# Patient Record
Sex: FEMALE | Race: WHITE | NOT HISPANIC OR LATINO | URBAN - METROPOLITAN AREA
[De-identification: names, ages, dates, MRNs, and addresses within clinical notes are randomized per-mention and may not be internally consistent; named-entity substitution may affect disease eponyms.]

---

## 2017-12-13 ENCOUNTER — HOSPITAL ENCOUNTER (OUTPATIENT)
Dept: CARDIOLOGY | Facility: HOSPITAL | Age: 67
Discharge: HOME | End: 2017-12-13

## 2017-12-19 VITALS
OXYGEN SATURATION: 97 % | HEART RATE: 75 BPM | RESPIRATION RATE: 14 BRPM | SYSTOLIC BLOOD PRESSURE: 167 MMHG | OXYGEN SATURATION: 98 % | OXYGEN SATURATION: 100 % | RESPIRATION RATE: 8 BRPM | SYSTOLIC BLOOD PRESSURE: 137 MMHG | HEART RATE: 68 BPM | DIASTOLIC BLOOD PRESSURE: 71 MMHG | DIASTOLIC BLOOD PRESSURE: 94 MMHG | HEART RATE: 67 BPM | WEIGHT: 140 LBS | SYSTOLIC BLOOD PRESSURE: 135 MMHG | SYSTOLIC BLOOD PRESSURE: 155 MMHG | HEART RATE: 71 BPM | SYSTOLIC BLOOD PRESSURE: 139 MMHG | RESPIRATION RATE: 19 BRPM | HEART RATE: 70 BPM | DIASTOLIC BLOOD PRESSURE: 61 MMHG | RESPIRATION RATE: 15 BRPM | DIASTOLIC BLOOD PRESSURE: 78 MMHG | DIASTOLIC BLOOD PRESSURE: 75 MMHG | SYSTOLIC BLOOD PRESSURE: 141 MMHG | HEART RATE: 69 BPM | OXYGEN SATURATION: 99 % | RESPIRATION RATE: 18 BRPM | HEART RATE: 74 BPM | HEART RATE: 65 BPM | SYSTOLIC BLOOD PRESSURE: 152 MMHG | TEMPERATURE: 98.8 F | DIASTOLIC BLOOD PRESSURE: 91 MMHG | RESPIRATION RATE: 20 BRPM | SYSTOLIC BLOOD PRESSURE: 150 MMHG | HEART RATE: 80 BPM | SYSTOLIC BLOOD PRESSURE: 130 MMHG | DIASTOLIC BLOOD PRESSURE: 74 MMHG | OXYGEN SATURATION: 96 % | DIASTOLIC BLOOD PRESSURE: 90 MMHG | SYSTOLIC BLOOD PRESSURE: 133 MMHG | TEMPERATURE: 98.5 F | DIASTOLIC BLOOD PRESSURE: 88 MMHG

## 2017-12-20 ENCOUNTER — AMBULATORY SURGICAL CENTER (AMBULATORY)
Dept: URBAN - METROPOLITAN AREA SURGERY 17 | Facility: SURGERY | Age: 67
End: 2017-12-20
Payer: COMMERCIAL

## 2017-12-20 ENCOUNTER — OFFICE (AMBULATORY)
Dept: URBAN - METROPOLITAN AREA CLINIC 64 | Facility: CLINIC | Age: 67
End: 2017-12-20

## 2017-12-20 DIAGNOSIS — Z83.71 FAMILY HISTORY OF COLONIC POLYPS: ICD-10-CM

## 2017-12-20 DIAGNOSIS — Z12.11 ENCOUNTER FOR SCREENING FOR MALIGNANT NEOPLASM OF COLON: ICD-10-CM

## 2017-12-20 DIAGNOSIS — K57.30 DIVERTICULOSIS OF LARGE INTESTINE WITHOUT PERFORATION OR ABS: ICD-10-CM

## 2017-12-20 DIAGNOSIS — K62.1 RECTAL POLYP: ICD-10-CM

## 2017-12-20 DIAGNOSIS — D12.0 BENIGN NEOPLASM OF CECUM: ICD-10-CM

## 2017-12-20 DIAGNOSIS — K64.8 OTHER HEMORRHOIDS: ICD-10-CM

## 2017-12-20 DIAGNOSIS — D12.5 BENIGN NEOPLASM OF SIGMOID COLON: ICD-10-CM

## 2017-12-20 LAB
GI HISTOLOGY: A. UNSPECIFIED: (no result)
GI HISTOLOGY: B. UNSPECIFIED: (no result)
GI HISTOLOGY: PDF REPORT: (no result)

## 2017-12-20 PROCEDURE — 88305 TISSUE EXAM BY PATHOLOGIST: CPT

## 2017-12-20 PROCEDURE — 45380 COLONOSCOPY AND BIOPSY: CPT | Mod: 33

## 2017-12-20 RX ADMIN — PROPOFOL 100 MG: 10 INJECTION, EMULSION INTRAVENOUS at 14:06

## 2017-12-20 RX ADMIN — LIDOCAINE HYDROCHLORIDE 25 MG: 10 INJECTION, SOLUTION EPIDURAL; INFILTRATION; INTRACAUDAL; PERINEURAL at 14:06

## 2017-12-20 RX ADMIN — PROPOFOL 25 MG: 10 INJECTION, EMULSION INTRAVENOUS at 14:20

## 2017-12-20 RX ADMIN — PROPOFOL 25 MG: 10 INJECTION, EMULSION INTRAVENOUS at 14:15

## 2017-12-20 RX ADMIN — PROPOFOL 25 MG: 10 INJECTION, EMULSION INTRAVENOUS at 14:12

## 2017-12-20 RX ADMIN — PROPOFOL 50 MG: 10 INJECTION, EMULSION INTRAVENOUS at 14:09

## 2023-02-01 PROBLEM — Z83.71 FAMILY HISTORY OF COLON POLYPS: Status: ACTIVE | Noted: 2017-12-20

## 2023-03-23 ENCOUNTER — OFFICE (AMBULATORY)
Dept: URBAN - METROPOLITAN AREA PATHOLOGY 4 | Facility: PATHOLOGY | Age: 73
End: 2023-03-23
Payer: COMMERCIAL

## 2023-03-23 ENCOUNTER — ON CAMPUS - OUTPATIENT (AMBULATORY)
Dept: URBAN - METROPOLITAN AREA HOSPITAL 2 | Facility: HOSPITAL | Age: 73
End: 2023-03-23
Payer: COMMERCIAL

## 2023-03-23 VITALS
DIASTOLIC BLOOD PRESSURE: 100 MMHG | RESPIRATION RATE: 18 BRPM | RESPIRATION RATE: 14 BRPM | HEART RATE: 80 BPM | HEART RATE: 79 BPM | DIASTOLIC BLOOD PRESSURE: 91 MMHG | DIASTOLIC BLOOD PRESSURE: 97 MMHG | DIASTOLIC BLOOD PRESSURE: 78 MMHG | WEIGHT: 144 LBS | SYSTOLIC BLOOD PRESSURE: 176 MMHG | OXYGEN SATURATION: 97 % | SYSTOLIC BLOOD PRESSURE: 145 MMHG | HEART RATE: 73 BPM | OXYGEN SATURATION: 95 % | SYSTOLIC BLOOD PRESSURE: 177 MMHG | SYSTOLIC BLOOD PRESSURE: 144 MMHG | HEART RATE: 65 BPM | RESPIRATION RATE: 17 BRPM | OXYGEN SATURATION: 99 % | OXYGEN SATURATION: 94 % | HEART RATE: 71 BPM | SYSTOLIC BLOOD PRESSURE: 166 MMHG | DIASTOLIC BLOOD PRESSURE: 89 MMHG | DIASTOLIC BLOOD PRESSURE: 102 MMHG | SYSTOLIC BLOOD PRESSURE: 147 MMHG | DIASTOLIC BLOOD PRESSURE: 79 MMHG | TEMPERATURE: 97.8 F | SYSTOLIC BLOOD PRESSURE: 132 MMHG | RESPIRATION RATE: 16 BRPM | OXYGEN SATURATION: 98 %

## 2023-03-23 DIAGNOSIS — Z86.010 PERSONAL HISTORY OF COLONIC POLYPS: ICD-10-CM

## 2023-03-23 DIAGNOSIS — D12.5 BENIGN NEOPLASM OF SIGMOID COLON: ICD-10-CM

## 2023-03-23 DIAGNOSIS — K57.30 DIVERTICULOSIS OF LARGE INTESTINE WITHOUT PERFORATION OR ABS: ICD-10-CM

## 2023-03-23 DIAGNOSIS — K64.1 SECOND DEGREE HEMORRHOIDS: ICD-10-CM

## 2023-03-23 DIAGNOSIS — D12.4 BENIGN NEOPLASM OF DESCENDING COLON: ICD-10-CM

## 2023-03-23 PROBLEM — K63.5 POLYP OF COLON: Status: ACTIVE | Noted: 2023-03-23

## 2023-03-23 PROCEDURE — 88305 TISSUE EXAM BY PATHOLOGIST: CPT | Performed by: INTERNAL MEDICINE

## 2023-03-23 PROCEDURE — 45385 COLONOSCOPY W/LESION REMOVAL: CPT | Mod: 33 | Performed by: INTERNAL MEDICINE

## 2023-03-23 RX ADMIN — ONDANSETRON HYDROCHLORIDE 4 MG: 2 INJECTION, SOLUTION INTRAMUSCULAR; INTRAVENOUS at 07:39

## 2023-06-19 ENCOUNTER — TRANSCRIBE ORDERS (OUTPATIENT)
Dept: ADMINISTRATIVE | Facility: HOSPITAL | Age: 73
End: 2023-06-19

## 2023-06-19 DIAGNOSIS — Z13.820 SCREENING FOR OSTEOPOROSIS: Primary | ICD-10-CM

## 2024-04-10 ENCOUNTER — TRANSCRIBE ORDERS (OUTPATIENT)
Dept: ADMINISTRATIVE | Facility: HOSPITAL | Age: 74
End: 2024-04-10
Payer: MEDICARE

## 2024-04-10 DIAGNOSIS — E83.52 HYPERCALCEMIA: Primary | ICD-10-CM

## 2024-06-24 ENCOUNTER — HOSPITAL ENCOUNTER (OUTPATIENT)
Dept: BONE DENSITY | Facility: HOSPITAL | Age: 74
Discharge: HOME | End: 2024-06-24
Admitting: HOSPITALIST
Payer: MEDICARE

## 2024-06-24 DIAGNOSIS — E83.52 HYPERCALCEMIA: ICD-10-CM

## 2024-06-24 PROCEDURE — 77080 DXA BONE DENSITY AXIAL: CPT

## 2024-06-25 ENCOUNTER — OFFICE VISIT (OUTPATIENT)
Dept: ENDOCRINOLOGY | Facility: CLINIC | Age: 74
End: 2024-06-25
Payer: MEDICARE

## 2024-06-25 VITALS
HEIGHT: 58 IN | SYSTOLIC BLOOD PRESSURE: 144 MMHG | WEIGHT: 140 LBS | DIASTOLIC BLOOD PRESSURE: 82 MMHG | OXYGEN SATURATION: 95 % | BODY MASS INDEX: 29.39 KG/M2 | HEART RATE: 55 BPM

## 2024-06-25 DIAGNOSIS — E11.65 TYPE 2 DIABETES MELLITUS WITH HYPERGLYCEMIA, WITH LONG-TERM CURRENT USE OF INSULIN: ICD-10-CM

## 2024-06-25 DIAGNOSIS — N18.31 STAGE 3A CHRONIC KIDNEY DISEASE: ICD-10-CM

## 2024-06-25 DIAGNOSIS — Z79.4 TYPE 2 DIABETES MELLITUS WITH HYPERGLYCEMIA, WITH LONG-TERM CURRENT USE OF INSULIN: ICD-10-CM

## 2024-06-25 DIAGNOSIS — E21.0 PRIMARY HYPERPARATHYROIDISM: ICD-10-CM

## 2024-06-25 DIAGNOSIS — E83.52 HYPERCALCEMIA: Primary | ICD-10-CM

## 2024-06-25 PROCEDURE — G2211 COMPLEX E/M VISIT ADD ON: HCPCS | Performed by: INTERNAL MEDICINE

## 2024-06-25 PROCEDURE — 1159F MED LIST DOCD IN RCRD: CPT | Performed by: INTERNAL MEDICINE

## 2024-06-25 PROCEDURE — 99204 OFFICE O/P NEW MOD 45 MIN: CPT | Performed by: INTERNAL MEDICINE

## 2024-06-25 PROCEDURE — 1160F RVW MEDS BY RX/DR IN RCRD: CPT | Performed by: INTERNAL MEDICINE

## 2024-06-25 RX ORDER — ASPIRIN 81 MG/1
81 TABLET ORAL DAILY
COMMUNITY

## 2024-06-25 RX ORDER — METOPROLOL TARTRATE 50 MG/1
TABLET, FILM COATED ORAL
COMMUNITY

## 2024-06-25 RX ORDER — ROSUVASTATIN CALCIUM 10 MG/1
TABLET, COATED ORAL
COMMUNITY
Start: 2024-05-06

## 2024-06-25 RX ORDER — GLIPIZIDE 5 MG/1
TABLET ORAL
COMMUNITY
Start: 2024-06-14

## 2024-06-25 RX ORDER — ONDANSETRON 8 MG/1
1 TABLET, ORALLY DISINTEGRATING ORAL
COMMUNITY

## 2024-06-25 RX ORDER — MONTELUKAST SODIUM 10 MG/1
TABLET ORAL
COMMUNITY
Start: 2024-06-14

## 2024-06-25 RX ORDER — LOSARTAN POTASSIUM 100 MG/1
1 TABLET ORAL DAILY
COMMUNITY
Start: 2024-06-14

## 2024-06-25 RX ORDER — PROMETHAZINE HYDROCHLORIDE 25 MG/1
1 TABLET ORAL
COMMUNITY

## 2024-06-25 RX ORDER — CIDER VINEGAR 300 MG
1 TABLET ORAL 2 TIMES DAILY
COMMUNITY

## 2024-06-25 RX ORDER — FLUTICASONE PROPIONATE 50 MCG
SPRAY, SUSPENSION (ML) NASAL
COMMUNITY
Start: 2024-05-01

## 2024-06-25 RX ORDER — FEXOFENADINE HCL 180 MG/1
1 TABLET ORAL DAILY
COMMUNITY

## 2024-06-25 NOTE — PATIENT INSTRUCTIONS
Please,    - Start consuming 6 to 8 glasses of water every day.  - Please start taking vitamin D1 1000 units every day with meal.  - Maintain calcium intake around 1200 mg every day, please review the handout along with the visit summary.  - Plan for 24-hour urine evaluation along with spot blood work.  - Plan for SPECT-CT scan.  - Repeat blood work and clinical follow-up in 3 months time.    Thank you for your visit today.    If you have any questions or concerns please feel free to reach out of the office.

## 2024-06-25 NOTE — PROGRESS NOTES
-----------------------------------------------------------------  ENDOCRINE CLINIC NOTE  -----------------------------------------------------------------        PATIENT NAME: Arpit Henry  PATIENT : 1950 AGE: 73 y.o.  MRN NUMBER: [id number]  PRIMARY CARE: Provider, No Known    ==========================================================================    CHIEF COMPLAINT: Hypercalcemia  DATE OF SERVICE: 24    ==========================================================================    HPI / SUBJECTIVE    73 y.o. female is seen in the clinic today for hypercalcemia.    - Onset of hypercalcemia: 2-3 yrs time  - History of kidney stones: None  - History of fractures: None  - History of osteoporosis: None and last DEXA Scan in  and recently had repeat DXA Scan still pending results  - Family history of osteoporosis/fractures/parathyroid or calcium problems: None  - History of cancer or granulomatous disease: None  - Use of lithium: None  - Use of thiazides: Use to be on HCTZ stopped around 3-4 yrs back      - Bone pains: None  - Constipation: +ve on OTC Senna  - Fatigue: None  - Memory issues or confusion: Occasionally  - Loss of height: +ve height loss around 1/2 inch  - History of renal insufficiency: CKD IIIa      - Calcium intake: None   * Milk: Couple of times a week   * Yogurt: None   * Cheese: Couple of times a week   * Supplements: None  - Vitamin D supplementation or sun exposure: None    ==========================================================================                                                PAST MEDICAL HISTORY    Past Medical History:   Diagnosis Date    Diabetes insipidus     4 to 5 years or more    Hyperlipidemia     Years    Hypertension     Years    Hypoglycemia     I dont know       ==========================================================================    PAST SURGICAL HISTORY    Past Surgical History:   Procedure Laterality Date    CYST REMOVAL      NECK,  SHOULDER, BREAST, THUMB    HYSTERECTOMY      PARTIAL HYSTERECTOMY    TUBAL ABDOMINAL LIGATION         ==========================================================================    FAMILY HISTORY    Family History   Problem Relation Age of Onset    Hyperlipidemia Mother     Cancer Mother     Heart failure Mother     Hypertension Mother     Thyroid disease Mother     Stroke Mother     Diabetes Father     Hypertension Father     Heart failure Father     Hyperlipidemia Father     Heart failure Maternal Aunt     Cancer Paternal Aunt     Heart failure Paternal Aunt     Hyperlipidemia Maternal Grandmother     Hyperlipidemia Maternal Grandfather     Hyperlipidemia Paternal Grandmother     Heart failure Paternal Grandmother     Hyperlipidemia Paternal Grandfather     Stroke Daughter     Hyperlipidemia Son     Cancer Son     Hyperlipidemia Son        ==========================================================================    SOCIAL HISTORY    Social History     Socioeconomic History    Marital status:    Tobacco Use    Smoking status: Never    Smokeless tobacco: Never   Vaping Use    Vaping status: Never Used   Substance and Sexual Activity    Alcohol use: Yes     Comment: 1 X YEAR    Drug use: Never    Sexual activity: Not Currently     Partners: Male     Birth control/protection: None, Tubal ligation, Hysterectomy       ==========================================================================    MEDICATIONS      Current Outpatient Medications:     aspirin 81 MG EC tablet, Take 1 tablet by mouth Daily., Disp: , Rfl:     fexofenadine (ALLEGRA) 180 MG tablet, Take 1 tablet by mouth Daily., Disp: , Rfl:     fluticasone (FLONASE) 50 MCG/ACT nasal spray, Spray 2 sprays every day by intranasal route in the morning., Disp: , Rfl:     glipizide (GLUCOTROL) 5 MG tablet, Take 2 tablets every day by oral route for 90 days., Disp: , Rfl:     losartan (COZAAR) 100 MG tablet, Take 1 tablet by mouth Daily., Disp: , Rfl:      Magnesium 500 MG tablet, Take 1 tablet by mouth Daily., Disp: , Rfl:     metoprolol tartrate (LOPRESSOR) 50 MG tablet, metoprolol tartrate 50 mg tablet  1 tablet twice a day by mouth, Disp: , Rfl:     montelukast (SINGULAIR) 10 MG tablet, Take 1 tablet every day by oral route in the morning for 90 days., Disp: , Rfl:     Omega-3 Fatty Acids (Fish Oil) 1000 MG capsule delayed-release, Take 1 capsule by mouth 2 (Two) Times a Day., Disp: , Rfl:     ondansetron ODT (ZOFRAN-ODT) 8 MG disintegrating tablet, Take 1 tablet by mouth., Disp: , Rfl:     promethazine (PHENERGAN) 25 MG tablet, Take 1 tablet by mouth., Disp: , Rfl:     rosuvastatin (CRESTOR) 10 MG tablet, Take 2 tablets every day by oral route., Disp: , Rfl:     ==========================================================================    ALLERGIES    Allergies   Allergen Reactions    Codeine Hallucinations and Other (See Comments)    Penicillins Rash       ==========================================================================    OBJECTIVE    Vitals:    06/25/24 1127   BP: 144/82   Pulse: 55   SpO2: 95%     Body mass index is 29.26 kg/m².     General: Alert, cooperative, no acute distress  Thyroid:  no enlargement/tenderness/palpable nodules  Lungs: Clear to auscultation bilaterally, respirations unlabored  Heart: Regular rate and rhythm, S1 and S2 normal, no murmur, rub or gallop  Abdomen: Soft, NT, ND and Bowel sounds Positive  Extremities:  Extremities normal, atraumatic, no cyanosis or edema    ==========================================================================    LAB EVALUATION    RENAL FUNCTION PANEL [LAB19] (Order [id number])  Order  Date: 6/3/2024 Department: Generic External Data Department Documenting/Authorizing: Provider, Generic External Data     Reprint Order Requisition    RENAL FUNCTION PANEL (Order #[id number]) on 6/3/24            suggestion  Result Information displayed in this report will not trend and may not trigger automated  decision support.      Contains abnormal data RENAL FUNCTION PANEL  Order: [id number]  Component  Ref Range & Units 3 wk ago   Glucose  70 - 99 mg/dL 219 High    BUN  8 - 27 mg/dL 17   Creatinine  0.57 - 1.00 mg/dL 1.11 High    eGFR CKD-EPI CR 2021  >59 mL/min/1.73 53 Low    BUN/Creatinine Ratio  12 - 28 15   Sodium  134 - 144 mmol/L 137   Potassium  3.5 - 5.2 mmol/L 4.3   Chloride  96 - 106 mmol/L 98   Bicarbonate (CO2)  20 - 29 mmol/L 26   Calcium  8.7 - 10.3 mg/dL 11.0 High    Phosphorus  3.0 - 4.3 mg/dL 3.6   Albumin  3.8 - 4.8 g/dL 4.7   Resulting Agency See order comments   Narrative  Performed by [facility]  Performed at:  [address]  [facility]  [id number]  : Enrique Myers PhD, Phone:  [phone]    Specimen Collected: 06/03/24 11:24    Performed by: MARCEL Last Resulted: 06/04/24 14:10   Received From: [facility]  Result Received: 06/24/24 12:46       PTH, INTACT [CYS235] (Order [id number])  Order  Date: 6/3/2024 Department: Generic External Data Department Documenting/Authorizing: Provider, Generic External Data     Reprint Order Requisition    PTH, INTACT (Order #[id number]) on 6/3/24           PTH, INTACT  Order: [id number]  Status: Final result       Next appt: None    Specimen Information: Blood, Venous       Component  Ref Range & Units 3 wk ago   PTH, Intact  15 - 65 pg/mL 58   Resulting Agency See order comments              Narrative  Performed by: [facility]  Performed at:  [address] - [facility]  [id number]  : Enrique Myers PhD, Phone:  [phone]      Specimen Collected: 06/03/24 11:24 EDT    Performed By: MARCEL Last Resulted: 06/04/24 14:10 EDT   Received From: [facility]  Result Received: 06/24/24 12:46           VITAMIN D,25-HYDROXY [KRL655] (Order [id number])  Order  Date: 6/3/2024 Department: Generic External Data Department Documenting/Authorizing: Provider, Generic External Data     Reprint Order  Requisition    VITAMIN D,25-HYDROXY (Order #[id number]) on 6/3/24           VITAMIN D,25-HYDROXY  Order: [id number]  Status: Final result       Next appt: None    Specimen Information: Blood, Venous       Component  Ref Range & Units 3 wk ago   25 Hydroxy, Vitamin D  30.0 - 100.0 ng/mL 40.5   Comment: Vitamin D deficiency has been defined by the [place] of  Medicine and an Endocrine Society practice guideline as a  level of serum 25-OH vitamin D less than 20 ng/mL (1,2).  The Endocrine Society went on to further define vitamin D  insufficiency as a level between 21 and 29 ng/mL (2).  1. IOM ([place] of [employer]). 2010. Dietary reference     intakes for calcium and D. [place]: The     National Academies Press.  2. Satish MF, Catracho MONGE, Kendra LÓPEZ, et al.     Evaluation, treatment, and prevention of vitamin D     deficiency: an Endocrine Society clinical practice     guideline. JCEM. 2011 Jul; 96(7):1911-30.   Resulting Agency See order comments              Narrative  Performed by: [facility]  Performed at:  [address] - [facility] [address]  [id number]  : Enrique Myers PhD, Phone:  [phone]      Specimen Collected: 06/03/24 11:24 EDT    Performed By: [facility] Last Resulted: 06/04/24 14:10 EDT   Received From: [facility]  Result Received: 06/24/24 12:46             3/15/2024  A1c 7.9%  Vitamin B12 361  Total cholesterol 118, triglyceride 205, HDL 39, LDL 46  Glucose 144, BUN 16, creatinine 1.17, GFR 50, sodium 136, potassium 4.8, calcium 10.7, albumin 4.3, total protein 6.5, ALP 58, AST 17, ALT 12  WBC 5.7, hemoglobin 12.8, platelet 182  TSH 1.580  Free T41.07    ==========================================================================    ASSESSMENT AND PLAN    # Hypercalcemia with primary hyperparathyroidism  - Patient evidence of true hypercalcemia with abnormally normal PTH level suggesting primary hyperparathyroidism  - Patient had vitamin D levels done in  June 2024 which were normal  - Will further evaluate with SPEP and 24-hour urine workup as well  - Pathophysiology of parathyroid gland and hypercalcemia was discussed with patient in detail to which she verbalized understanding  - Patient to maintain calcium intake around 1200 mg every day, ideally through nutritional sources, handout provided  - Patient also start taking vitamin D 1000 units every day which was counseled to the patient as well in detail to which verbalized understanding  - Patient to maintain hydration around 6 to 8 glasses of water every day to which she verbalized understanding  - Patient underlying history of CKD stage IIIa and therefore patient is a surgical candidate  - Will order SPECT-CT scan for evaluation  - Patient already had a DEXA scan performed on 6/24/2024, pending results prior to that patient have a history of normal DEXA scan  - After repeat blood work in next follow-up visit possibly will plan for surgical evaluation    # CKD stage IIIa  # Type 2 diabetes, being managed by primary care    Thank you for courtesy of consultation.    Return to clinic: 3 months    Entire assessment and plan was discussed and counseled the patient in detail to which patient verbalized understanding and agreed with care.  Answered all queries and concerns.    Part of this note may be an electronic transcription/translation of spoken language to printed text using the Dragon Dictation System.     Note: Portions of this note may have been copied from previous notes but documentation have been reviewed and edited as necessary to support clinical decision making for today's visit.    ==========================================================================    INFORMATION PROVIDED TO PATIENT    Patient Instructions   Please,    - Start consuming 6 to 8 glasses of water every day.  - Please start taking vitamin D1 1000 units every day with meal.  - Maintain calcium intake around 1200 mg every day, please  review the handout along with the visit summary.  - Plan for 24-hour urine evaluation along with spot blood work.  - Plan for SPECT-CT scan.  - Repeat blood work and clinical follow-up in 3 months time.    Thank you for your visit today.    If you have any questions or concerns please feel free to reach out of the office.       ==========================================================================  William Chavarria MD  Department of Endocrine, Diabetes and Metabolism  [place], [place]  ==========================================================================

## 2024-07-02 LAB
ALBUMIN SERPL ELPH-MCNC: 3.8 G/DL (ref 2.9–4.4)
ALBUMIN/GLOB SERPL: 1.3 {RATIO} (ref 0.7–1.7)
ALPHA1 GLOB SERPL ELPH-MCNC: 0.2 G/DL (ref 0–0.4)
ALPHA2 GLOB SERPL ELPH-MCNC: 0.8 G/DL (ref 0.4–1)
B-GLOBULIN SERPL ELPH-MCNC: 1.1 G/DL (ref 0.7–1.3)
GAMMA GLOB SERPL ELPH-MCNC: 0.8 G/DL (ref 0.4–1.8)
GLOBULIN SER CALC-MCNC: 2.9 G/DL (ref 2.2–3.9)
LABORATORY COMMENT REPORT: NORMAL
M PROTEIN SERPL ELPH-MCNC: NORMAL G/DL
PROT SERPL-MCNC: 6.7 G/DL (ref 6–8.5)

## 2024-07-04 LAB
CALCIUM 24H UR-MCNC: 6.8 MG/DL
CALCIUM 24H UR-MRATE: 78 MG/24 HR (ref 0–320)

## 2024-07-24 ENCOUNTER — HOSPITAL ENCOUNTER (OUTPATIENT)
Dept: NUCLEAR MEDICINE | Facility: HOSPITAL | Age: 74
Discharge: HOME | End: 2024-07-24
Payer: MEDICARE

## 2024-07-24 DIAGNOSIS — E21.0 PRIMARY HYPERPARATHYROIDISM: ICD-10-CM

## 2024-07-24 DIAGNOSIS — E83.52 HYPERCALCEMIA: ICD-10-CM

## 2024-07-24 PROCEDURE — 0 TECHNETIUM SESTAMIBI: Performed by: INTERNAL MEDICINE

## 2024-07-24 PROCEDURE — 78072 PARATHYRD PLANAR W/SPECT&CT: CPT

## 2024-07-24 PROCEDURE — A9500 TC99M SESTAMIBI: HCPCS | Performed by: INTERNAL MEDICINE

## 2024-07-24 RX ADMIN — TECHNETIUM TC 99M SESTAMIBI 1 DOSE: 1 INJECTION INTRAVENOUS at 11:43

## 2024-07-29 ENCOUNTER — TELEPHONE (OUTPATIENT)
Dept: ENDOCRINOLOGY | Facility: CLINIC | Age: 74
End: 2024-07-29

## 2024-07-31 ENCOUNTER — TELEPHONE (OUTPATIENT)
Dept: ENDOCRINOLOGY | Facility: CLINIC | Age: 74
End: 2024-07-31
Payer: MEDICARE

## 2024-07-31 DIAGNOSIS — E04.1 THYROID NODULE: Primary | ICD-10-CM

## 2024-07-31 NOTE — TELEPHONE ENCOUNTER
Pt is calling about her NM Parathyroid Scan results.     Asked it to be faxed to [phone].     Please advise.

## 2024-08-02 NOTE — TELEPHONE ENCOUNTER
Called patient and care discussed with patient over the phone.  NM parathyroid scan reviewed.  There is possible parathyroid adenoma vs thyroid nodule.  Ordered US thyroid for evaluation.    William Chavarria MD  15:52 EDT  08/02/24

## 2024-08-02 NOTE — TELEPHONE ENCOUNTER
Spoke with patient and let her know previous message have been sent to provider. Put on providers desk.

## 2024-08-02 NOTE — TELEPHONE ENCOUNTER
Hub staff attempted to follow warm transfer process and was unsuccessful     Caller: Arpit Henry    Relationship to patient: Self    Best call back number: [phone]    Patient is needing: PT IS CALLING TO SPEAK WITH A NURSE BECAUSE SHE HAS NOT HEARD FROM OUR OFFICE ON THE TEST RESULTS. PLEASE CALL AND ADVISE. IT'S OK TO [facility].

## 2024-08-23 ENCOUNTER — HOSPITAL ENCOUNTER (OUTPATIENT)
Dept: ULTRASOUND IMAGING | Facility: HOSPITAL | Age: 74
Discharge: HOME | End: 2024-08-23
Payer: MEDICARE

## 2024-08-23 DIAGNOSIS — E04.1 THYROID NODULE: ICD-10-CM

## 2024-08-23 PROCEDURE — 76536 US EXAM OF HEAD AND NECK: CPT

## 2024-08-30 ENCOUNTER — TELEPHONE (OUTPATIENT)
Dept: ENDOCRINOLOGY | Facility: CLINIC | Age: 74
End: 2024-08-30
Payer: MEDICARE

## 2024-08-30 DIAGNOSIS — E21.0 PRIMARY HYPERPARATHYROIDISM: Primary | ICD-10-CM

## 2024-09-06 ENCOUNTER — TELEPHONE (OUTPATIENT)
Dept: ENDOCRINOLOGY | Facility: CLINIC | Age: 74
End: 2024-09-06
Payer: MEDICARE

## 2024-09-06 NOTE — TELEPHONE ENCOUNTER
Note:    - Care discussed with patient over the phone in detail.  - Patient had ultrasound thyroid which does not require any FNA but will plan for yearly ultrasounds for now.  - Regarding hypercalcemia patient recently had blood work done on 8/23/2024 which showed persistent high calcium levels with inappropriately normal PTH.  - Patient does not have any indication for surgery but provided patient with the option that if she wants to get a surgical evaluation we will plan for referral but patient deferred at this time.  - Patient to maintain good calcium and vitamin D intake to prevent further triggering of PTH and to prevent bone from osteoporosis.  - Patient to maintain good hydration around 6 to 8 glasses of water every day, this was counseled to the patient in detail to which she verbalized understanding.  - Repeat lab work and clinical follow-up in December 2024.    William Chavarria MD  13:20 EDT  09/06/24

## 2024-09-08 NOTE — PROGRESS NOTES
Encounter Date:09/10/2024      Patient ID: Arpit Henry is a 73 y.o. female.    Chief Complaint:  Hypertension  Dyslipidemia  Diabetes  Mammogram showing arterial calcification    History of Present Illness    The patient is a pleasant 73-year-old white female is here for evaluation of cardiovascular status.  Patient had routine mammogram and showed arterial calcification and patient was referred for possible correlation between breast arterial calcification and coronary arteries.  Patient is absolutely asymptomatic.  Patient plays pickle ball etc. without any problems.  She plays golf without any symptoms.    The patient has been without any chest discomfort ,shortness of breath, palpitations, dizziness or syncope.  Denies having any headache ,abdominal pain ,nausea, vomiting , diarrhea constipation, loss of weight or loss of appetite.  Denies having any excessive bruising ,hematuria or blood in the stool.    Review of all systems negative except as indicated.    Reviewed ROS.  Assessment and Plan     ]]]]]]]]]]]]]]]]]]]]]]]]  Impression  ===========  - Mammogram showing arterial calcification  Mammogram August 2024      Patient is absolutely asymptomatic.    - Diabetes hypertension dyslipidemia CKD 3    - Status post hysterectomy abdominal tubal ligation    - Family history of dyslipidemia    - Non-smoker    - Allergic to penicillin and codeine  ============  Plan  ===============     Mammogram showing arterial calcification  Mammogram August 2024      Patient is absolutely asymptomatic.    Patient does have multiple coronary risk factors.  EKG showed sinus rhythm without ST ischemic changes.    Consider ischemic cardiac workup if patient has any symptoms.  Patient was educated regarding symptoms to watch for.    Follow-up in the office in 6 months.    Further plan will depend on patient's progress.  ]]]]]]]]]]]]]]]]]]]]]]]]       Diagnosis Plan   1. Dyslipidemia        2. Essential hypertension        3.  Type 2 diabetes mellitus without complication, without long-term current use of insulin        4. Stage 3 chronic kidney disease, unspecified whether stage 3a or 3b CKD        LAB RESULTS (LAST 7 DAYS)    CBC        BMP        CMP         BNP        TROPONIN        CoAg        Creatinine Clearance  CrCl cannot be calculated (No successful lab value found.).    ABG        Radiology  No radiology results for the last day                The following portions of the patient's history were reviewed and updated as appropriate: allergies, current medications, past family history, past medical history, past social history, past surgical history, and problem list.    Review of Systems   Constitutional: Negative for malaise/fatigue.   Cardiovascular:  Negative for chest pain, dyspnea on exertion, leg swelling and palpitations.   Respiratory:  Negative for cough and shortness of breath.    Gastrointestinal:  Negative for abdominal pain, nausea and vomiting.   Neurological:  Negative for dizziness, focal weakness, headaches, light-headedness and numbness.   All other systems reviewed and are negative.        Current Outpatient Medications:     aspirin 81 MG EC tablet, Take 1 tablet by mouth Daily., Disp: , Rfl:     fexofenadine (ALLEGRA) 180 MG tablet, Take 1 tablet by mouth Daily., Disp: , Rfl:     fluticasone (FLONASE) 50 MCG/ACT nasal spray, Spray 2 sprays every day by intranasal route in the morning., Disp: , Rfl:     glipizide (GLUCOTROL) 5 MG tablet, Take 2 tablets every day by oral route for 90 days., Disp: , Rfl:     losartan (COZAAR) 100 MG tablet, Take 1 tablet by mouth Daily., Disp: , Rfl:     Magnesium 500 MG tablet, Take 1 tablet by mouth Daily., Disp: , Rfl:     metoprolol succinate XL (TOPROL-XL) 100 MG 24 hr tablet, Take 1 tablet by mouth Daily., Disp: , Rfl:     montelukast (SINGULAIR) 10 MG tablet, Take 1 tablet every day by oral route in the morning for 90 days., Disp: , Rfl:     Omega-3 Fatty Acids (Fish  Oil) 1000 MG capsule delayed-release, Take 1 capsule by mouth 2 (Two) Times a Day., Disp: , Rfl:     ondansetron ODT (ZOFRAN-ODT) 8 MG disintegrating tablet, Take 1 tablet by mouth., Disp: , Rfl:     promethazine (PHENERGAN) 25 MG tablet, Take 1 tablet by mouth., Disp: , Rfl:     rosuvastatin (CRESTOR) 10 MG tablet, Take 2 tablets every day by oral route., Disp: , Rfl:     Trulicity 0.75 MG/0.5ML solution pen-injector, INJECT 0.75 MG EVERY WEEK BY SUBCUTANEOUS ROUTE AS DIRECTED., Disp: , Rfl:     Allergies   Allergen Reactions    Codeine Hallucinations and Other (See Comments)    Penicillins Rash       Family History   Problem Relation Age of Onset    Hyperlipidemia Mother     Cancer Mother     Heart failure Mother     Hypertension Mother     Thyroid disease Mother     Stroke Mother     Diabetes Father     Hypertension Father     Heart failure Father     Hyperlipidemia Father     Heart failure Maternal Aunt     Cancer Paternal Aunt     Heart failure Paternal Aunt     Hyperlipidemia Maternal Grandmother     Hyperlipidemia Maternal Grandfather     Hyperlipidemia Paternal Grandmother     Heart failure Paternal Grandmother     Hyperlipidemia Paternal Grandfather     Stroke Daughter     Hyperlipidemia Son     Cancer Son     Hyperlipidemia Son        Past Surgical History:   Procedure Laterality Date    CYST REMOVAL      NECK, SHOULDER, BREAST, THUMB    HYSTERECTOMY      PARTIAL HYSTERECTOMY    TUBAL ABDOMINAL LIGATION         Past Medical History:   Diagnosis Date    Diabetes insipidus     4 to 5 years or more    Hyperlipidemia     Years    Hypertension     Years    Hypoglycemia     I dont know       Family History   Problem Relation Age of Onset    Hyperlipidemia Mother     Cancer Mother     Heart failure Mother     Hypertension Mother     Thyroid disease Mother     Stroke Mother     Diabetes Father     Hypertension Father     Heart failure Father     Hyperlipidemia Father     Heart failure Maternal Aunt     Cancer  "Paternal Aunt     Heart failure Paternal Aunt     Hyperlipidemia Maternal Grandmother     Hyperlipidemia Maternal Grandfather     Hyperlipidemia Paternal Grandmother     Heart failure Paternal Grandmother     Hyperlipidemia Paternal Grandfather     Stroke Daughter     Hyperlipidemia Son     Cancer Son     Hyperlipidemia Son        Social History     Socioeconomic History    Marital status:    Tobacco Use    Smoking status: Never     Passive exposure: Never    Smokeless tobacco: Never   Vaping Use    Vaping status: Never Used   Substance and Sexual Activity    Alcohol use: Yes     Comment: 1 X YEAR    Drug use: Never    Sexual activity: Not Currently     Partners: Male     Birth control/protection: None, Tubal ligation, Hysterectomy           ECG 12 Lead    Date/Time: 9/10/2024 2:25 PM  Performed by: Cristobal Dias MD    Authorized by: Cristobal Dias MD  Comparison: compared with previous ECG   Similar to previous ECG  Comparison to previous ECG: Normal sinus rhythm nonspecific ST-T wave changes 73/min normal axis normal intervals no ectopy no significant change from previous EKG.        Objective:       Physical Exam    /78 (BP Location: Right arm, Patient Position: Sitting, Cuff Size: Adult)   Pulse 67   Ht 147.3 cm (58\")   Wt 63.5 kg (140 lb)   SpO2 94%   BMI 29.26 kg/m²   The patient is alert, oriented and in no distress.    Vital signs as noted above.    Head and neck revealed no carotid bruits or jugular venous distension.  No thyromegaly or lymphadenopathy is present.    Lungs clear.  No wheezing.  Breath sounds are normal bilaterally.    Heart normal first and second heart sounds.  No murmur..  No pericardial rub is present.  No gallop is present.    Abdomen soft and nontender.  No organomegaly is present.    Extremities revealed good peripheral pulses without any pedal edema.    Skin warm and dry.    Musculoskeletal system is grossly normal.    CNS grossly normal.    Reviewed and " updated.

## 2024-09-10 ENCOUNTER — PATIENT ROUNDING (BHMG ONLY) (OUTPATIENT)
Dept: CARDIOLOGY | Facility: CLINIC | Age: 74
End: 2024-09-10

## 2024-09-10 ENCOUNTER — OFFICE VISIT (OUTPATIENT)
Dept: CARDIOLOGY | Facility: CLINIC | Age: 74
End: 2024-09-10
Payer: MEDICARE

## 2024-09-10 VITALS
HEIGHT: 58 IN | SYSTOLIC BLOOD PRESSURE: 144 MMHG | BODY MASS INDEX: 29.39 KG/M2 | DIASTOLIC BLOOD PRESSURE: 78 MMHG | WEIGHT: 140 LBS | HEART RATE: 67 BPM | OXYGEN SATURATION: 94 %

## 2024-09-10 DIAGNOSIS — I10 ESSENTIAL HYPERTENSION: ICD-10-CM

## 2024-09-10 DIAGNOSIS — N18.30 STAGE 3 CHRONIC KIDNEY DISEASE, UNSPECIFIED WHETHER STAGE 3A OR 3B CKD: ICD-10-CM

## 2024-09-10 DIAGNOSIS — E11.9 TYPE 2 DIABETES MELLITUS WITHOUT COMPLICATION, WITHOUT LONG-TERM CURRENT USE OF INSULIN: ICD-10-CM

## 2024-09-10 DIAGNOSIS — E78.5 DYSLIPIDEMIA: Primary | ICD-10-CM

## 2024-09-10 RX ORDER — DULAGLUTIDE 0.75 MG/.5ML
INJECTION, SOLUTION SUBCUTANEOUS
COMMUNITY
Start: 2024-09-03

## 2024-09-10 RX ORDER — METOPROLOL SUCCINATE 100 MG/1
1 TABLET, EXTENDED RELEASE ORAL DAILY
COMMUNITY

## 2024-09-10 NOTE — PROGRESS NOTES
A My-Chart message has been sent to the patient for PATIENT ROUNDING with [facility]

## 2024-10-14 ENCOUNTER — OFFICE VISIT (OUTPATIENT)
Dept: ENDOCRINOLOGY | Facility: CLINIC | Age: 74
End: 2024-10-14
Payer: MEDICARE

## 2024-10-14 VITALS
SYSTOLIC BLOOD PRESSURE: 146 MMHG | WEIGHT: 137 LBS | OXYGEN SATURATION: 94 % | DIASTOLIC BLOOD PRESSURE: 72 MMHG | HEIGHT: 58 IN | BODY MASS INDEX: 28.76 KG/M2 | HEART RATE: 69 BPM

## 2024-10-14 DIAGNOSIS — E83.52 HYPERCALCEMIA: ICD-10-CM

## 2024-10-14 DIAGNOSIS — E21.0 PRIMARY HYPERPARATHYROIDISM: Primary | ICD-10-CM

## 2024-10-14 DIAGNOSIS — E04.1 THYROID NODULE: ICD-10-CM

## 2024-10-14 DIAGNOSIS — E11.65 TYPE 2 DIABETES MELLITUS WITH HYPERGLYCEMIA, WITH LONG-TERM CURRENT USE OF INSULIN: ICD-10-CM

## 2024-10-14 DIAGNOSIS — Z79.4 TYPE 2 DIABETES MELLITUS WITH HYPERGLYCEMIA, WITH LONG-TERM CURRENT USE OF INSULIN: ICD-10-CM

## 2024-10-14 DIAGNOSIS — E55.9 VITAMIN D DEFICIENCY: ICD-10-CM

## 2024-10-14 DIAGNOSIS — N18.31 STAGE 3A CHRONIC KIDNEY DISEASE: ICD-10-CM

## 2024-10-14 PROCEDURE — 99214 OFFICE O/P EST MOD 30 MIN: CPT | Performed by: INTERNAL MEDICINE

## 2024-10-14 PROCEDURE — G2211 COMPLEX E/M VISIT ADD ON: HCPCS | Performed by: INTERNAL MEDICINE

## 2024-10-14 PROCEDURE — 1159F MED LIST DOCD IN RCRD: CPT | Performed by: INTERNAL MEDICINE

## 2024-10-14 PROCEDURE — 1160F RVW MEDS BY RX/DR IN RCRD: CPT | Performed by: INTERNAL MEDICINE

## 2024-10-14 RX ORDER — DAPAGLIFLOZIN 5 MG/1
5 TABLET, FILM COATED ORAL
COMMUNITY
Start: 2024-09-16

## 2024-10-14 NOTE — PROGRESS NOTES
-----------------------------------------------------------------  ENDOCRINE CLINIC NOTE  -----------------------------------------------------------------        PATIENT NAME: Arpit Hnery  PATIENT : 1950 AGE: 74 y.o.  MRN NUMBER: [id number]  PRIMARY CARE: Provider, No Known    ==========================================================================    CHIEF COMPLAINT: Hypercalcemia  DATE OF SERVICE: 10/14/24    ==========================================================================    HPI / SUBJECTIVE    74 y.o. female is seen in the clinic today for hypercalcemia.  Patient is known to have hypercalcemia for last 2 to 3 years time.  No history of kidney stones.  No fracture history.  Last DEXA scan was in 2024 which showed no evidence of osteoporosis.  No previous history of cancer.  Patient to have a history of constipation currently on conservative management.  History significant for CKD stage IIIa as well  Also underlying history of type 2 diabetes which is managed by primary care  Patient is currently maintaining calcium intake through dietary dairy sources where as she is taking vitamin D 1000 units every day.  Last blood work was in 2024 which showed abnormally normal PTH level with mild hypercalcemia.  Patient had 24-hour urine calcium evaluation along with SPEP evaluation.  Patient completed nuclear medicine parathyroid scan which showed/identified a thyroid nodule therefore patient underwent ultrasound thyroid as well which showed multiple thyroid nodules but does not meet criteria for FNA but require yearly monitoring.    ==========================================================================                                                PAST MEDICAL HISTORY    Past Medical History:   Diagnosis Date    Diabetes insipidus     4 to 5 years or more    Hyperlipidemia     Years    Hypertension     Years    Hypoglycemia     I dont know    Primary hyperparathyroidism  10/14/2024    Thyroid nodule 10/14/2024    Vitamin D deficiency 10/14/2024       ==========================================================================    PAST SURGICAL HISTORY    Past Surgical History:   Procedure Laterality Date    CYST REMOVAL      NECK, SHOULDER, BREAST, THUMB    HYSTERECTOMY      PARTIAL HYSTERECTOMY    TUBAL ABDOMINAL LIGATION         ==========================================================================    FAMILY HISTORY    Family History   Problem Relation Age of Onset    Hyperlipidemia Mother     Cancer Mother     Heart failure Mother     Hypertension Mother     Thyroid disease Mother     Stroke Mother     Diabetes Father     Hypertension Father     Heart failure Father     Hyperlipidemia Father     Heart failure Maternal Aunt     Cancer Paternal Aunt     Heart failure Paternal Aunt     Hyperlipidemia Maternal Grandmother     Hyperlipidemia Maternal Grandfather     Hyperlipidemia Paternal Grandmother     Heart failure Paternal Grandmother     Hyperlipidemia Paternal Grandfather     Stroke Daughter     Hyperlipidemia Son     Cancer Son     Hyperlipidemia Son        ==========================================================================    SOCIAL HISTORY    Social History     Socioeconomic History    Marital status:    Tobacco Use    Smoking status: Never     Passive exposure: Never    Smokeless tobacco: Never   Vaping Use    Vaping status: Never Used   Substance and Sexual Activity    Alcohol use: Yes     Comment: 1 X YEAR    Drug use: Never    Sexual activity: Not Currently     Partners: Male     Birth control/protection: None, Tubal ligation, Hysterectomy       ==========================================================================    MEDICATIONS      Current Outpatient Medications:     aspirin 81 MG EC tablet, Take 1 tablet by mouth Daily., Disp: , Rfl:     dapagliflozin (FARXIGA) 5 MG tablet tablet, Take 1 tablet by mouth., Disp: , Rfl:     fexofenadine (ALLEGRA) 180  MG tablet, Take 1 tablet by mouth Daily., Disp: , Rfl:     fluticasone (FLONASE) 50 MCG/ACT nasal spray, Spray 2 sprays every day by intranasal route in the morning., Disp: , Rfl:     glipizide (GLUCOTROL) 5 MG tablet, Take 2 tablets every day by oral route for 90 days., Disp: , Rfl:     losartan (COZAAR) 100 MG tablet, Take 1 tablet by mouth Daily., Disp: , Rfl:     Magnesium 500 MG tablet, Take 1 tablet by mouth Daily., Disp: , Rfl:     metoprolol succinate XL (TOPROL-XL) 100 MG 24 hr tablet, Take 1 tablet by mouth Daily., Disp: , Rfl:     montelukast (SINGULAIR) 10 MG tablet, Take 1 tablet every day by oral route in the morning for 90 days., Disp: , Rfl:     Omega-3 Fatty Acids (Fish Oil) 1000 MG capsule delayed-release, Take 1 capsule by mouth 2 (Two) Times a Day., Disp: , Rfl:     ondansetron ODT (ZOFRAN-ODT) 8 MG disintegrating tablet, Take 1 tablet by mouth., Disp: , Rfl:     promethazine (PHENERGAN) 25 MG tablet, Take 1 tablet by mouth., Disp: , Rfl:     rosuvastatin (CRESTOR) 10 MG tablet, Take 2 tablets every day by oral route., Disp: , Rfl:     Trulicity 0.75 MG/0.5ML solution pen-injector, INJECT 0.75 MG EVERY WEEK BY SUBCUTANEOUS ROUTE AS DIRECTED., Disp: , Rfl:     ==========================================================================    ALLERGIES    Allergies   Allergen Reactions    Codeine Hallucinations and Other (See Comments)    Penicillins Rash       ==========================================================================    OBJECTIVE    Vitals:    10/14/24 1303   BP: 146/72   Pulse: 69   SpO2: 94%     Body mass index is 28.63 kg/m².     General: Alert, cooperative, no acute distress  Thyroid:  no enlargement/tenderness/palpable nodules  Lungs: Clear to auscultation bilaterally, respirations unlabored  Heart: Regular rate and rhythm, S1 and S2 normal, no murmur, rub or gallop  Abdomen: Soft, NT, ND and Bowel sounds Positive  Extremities:  Extremities normal, atraumatic, no cyanosis or  edema    ==========================================================================    LAB EVALUATION    8/24/2024  PTH 42  Vitamin D 51.6  TSH 1.920  Hemoglobin A1c 9.5%  Phosphorus 3.5, Glucose 180, BUN 19, creatinine 1.09, GFR 54, sodium 138, potassium 4.9, chloride 98, bicarb 26, calcium 10.8 with albumin of 4.3 (corrected calcium 10.6), total bili 0.7, ALP 65, AST 14, ALT 30    ==========================================================================    NM PARATHYROID PLANAR WITH SPECT AND CT-     TECHNIQUE: 26.3 mCi of technetium 99m sestamibi was injected  intravenously with early and delayed anterior planar imaging of the neck  and chest. Early SPECT-CT imaging of the same region was obtained.     INDICATION: Primary hyperparathyroidism. Most recent PTH of 58 pg/mL.     COMPARISON: None available        FINDINGS: Planar imaging shows physiologic activity within the salivary  glands, thyroid, heart and partially visualized liver/GI tract. Thyroid  mostly washes out on delayed imaging. However, persistent abnormal focus  of more increased MDP activity overlying the left thyroid lobe on  delayed planar imaging. SPECT-CT imaging localizes the abnormal activity  on planar imaging to a slightly hypodense nodule within the upper left  thyroid lobe (series 3/image 66).        IMPRESSION:  A sestamibi avid nodule within the upper left thyroid lobe  may represent a parathyroid lesion. Primary thyroid lesion remains in  the differential. Recommend further evaluation with thyroid ultrasound.     This report was finalized on 7/29/2024 11:07 AM by Dr. Mervin Campbell M.D on Workstation: [id number]     ==========================================================================    US THYROID     Date of Exam: 8/23/2024 3:13 PM EDT     Indication: Thyroid nodule on Sestambi Scan.     Comparison: Outside parathyroid scan with SPECT imaging 7/24/2024.     Technique: Grayscale and color and Doppler ultrasound imaging of the  thyroid performed according to routine thyroid ultrasound protocol, real time with image documentation.        Findings:  The right thyroid lobe measures 3.8 x 1.5 x 1.7 cm. The left thyroid lobe measures 4.3 x 1.5 x 1.6 m. The isthmus measures 5 mm thickness. The thyroid parenchyma appears homogeneous. Multiple small bilateral thyroid nodules are present, with index   nodules included for purposes of this report.     There is a dominant index solid nodule within the left upper thyroid pole anteriorly measuring 1.5 x 1.2 x 1.5 cm (image 41, 44). It is solid, isoechoic, smoothly circumscribed, wider than tall, without calcification, corresponding to a TI-RADS category   3 nodule. This may correspond to the focus of abnormal sestamibi uptake on outside parathyroid SPECT imaging.     Within the left mid thyroid lobe posteriorly, a 0.7 x 0.6 x 1.0 cm solid nodule is seen. It is hypoechoic, smoothly circumscribed, wider than tall, without calcification, corresponding to a TI-RADS category 4 nodule.     Within the left lower thyroid pole, a 1.2 x 0.9 x 0.8 cm nodule is seen (images 30, 33). It is isoechoic, smoothly circumscribed, wider than tall, without calcification, corresponding to a TI-RADS category 3 nodule.     Within the right upper thyroid pole, a 0.4 x 0.4 x 0.5 cm solid nodule is seen. It is hypoechoic, smoothly circumscribed, wider than tall, without calcification, corresponding to a TI-RADS category 4 nodule.     No definite parathyroid nodularity is seen posterior to the thyroid gland.        IMPRESSION:  Impression:     1. 1.5 cm TI-RADS category 3 nodule in the left upper thyroid pole, which could correspond to the focus of elevated sestamibi uptake on outside SPECT imaging.  2. 1.5 cm TI-RADS category 3 and 1.0 cm TI-RADS category 4 nodule nodules are present. Consider thyroid ultrasound follow-up at 1, 2, and 5 years.     TI-RADS: TR4 - Size between 1 cm and 1.5 cm. Recommend follow up thyroid  ultrasound at years 1, 2, 3 and 5 of surveillance.   TI-RADS: TR3 - Size between 1.5 cm and 2.5 cm. Recommend follow up thyroid ultrasound at years 1, 3, and 5 years of  surveillance.     Electronically Signed: Mimi Ballesteros MD    8/26/2024 3:39 PM EDT    Workstation ID: [id number]    ==========================================================================    ASSESSMENT AND PLAN    # Hypercalcemia with primary hyperparathyroidism  - Reviewed blood work from August 2024 and patient have evidence of mild hypercalcemia with abnormally normal PTH level suggesting primary hyperparathyroidism  - Patient also have underlying CKD stage IIIa therefore patient is a surgical candidate  - Reviewed blood work and also reviewed parathyroid scan  - Discussed with patient about surgical intervention but deferred for now  - Patient to maintain good hydration around 6 to 8 glasses of water every day  - Will plan for repeat calcium evaluation with PTH and vitamin D in 6 months time    # Thyroid nodules  - Thyroid labs within normal limits  - Reviewed ultrasound from August 2024  - Will plan for repeat thyroid ultrasound in August 2025    # Type 2 diabetes with significant hyperglycemia and uncontrolled A1c, patient being managed by primary care  # CKD stage IIIa, following nephrology as outpatient    Return to clinic: 6 months    Entire assessment and plan was discussed and counseled the patient in detail to which patient verbalized understanding and agreed with care.  Answered all queries and concerns.    Part of this note may be an electronic transcription/translation of spoken language to printed text using the Dragon Dictation System.     Note: Portions of this note may have been copied from previous notes but documentation have been reviewed and edited as necessary to support clinical decision making for today's visit.    ==========================================================================    INFORMATION PROVIDED TO  PATIENT    Patient Instructions   Please,    - Maintain good hydration around 6 to 8 glasses of water every day.  - Maintain dietary calcium intake to daily sources.  - Maintain vitamin D 1000 units every day with meal.    Repeat blood work and clinical follow-up in 6 months time.    Thank you for your visit today.    If you have any questions or concerns please feel free to reach out of the office.       ==========================================================================  William Chavarria MD  Department of Endocrine, Diabetes and Metabolism  [place], [place]  ==========================================================================

## 2024-10-14 NOTE — PATIENT INSTRUCTIONS
Please,    - Maintain good hydration around 6 to 8 glasses of water every day.  - Maintain dietary calcium intake to daily sources.  - Maintain vitamin D 1000 units every day with meal.    Repeat blood work and clinical follow-up in 6 months time.    Thank you for your visit today.    If you have any questions or concerns please feel free to reach out of the office.

## 2025-02-24 ENCOUNTER — TRANSCRIBE ORDERS (OUTPATIENT)
Dept: ADMINISTRATIVE | Facility: HOSPITAL | Age: 75
End: 2025-02-24
Payer: MEDICARE

## 2025-02-24 DIAGNOSIS — I25.10 ATHEROSCLEROSIS OF NATIVE CORONARY ARTERY OF NATIVE HEART WITHOUT ANGINA PECTORIS: Primary | ICD-10-CM

## 2025-03-31 ENCOUNTER — HOSPITAL ENCOUNTER (OUTPATIENT)
Dept: CT IMAGING | Facility: HOSPITAL | Age: 75
Discharge: HOME | End: 2025-03-31
Admitting: NURSE PRACTITIONER
Payer: MEDICARE

## 2025-03-31 DIAGNOSIS — I25.10 ATHEROSCLEROSIS OF NATIVE CORONARY ARTERY OF NATIVE HEART WITHOUT ANGINA PECTORIS: ICD-10-CM

## 2025-03-31 PROCEDURE — 75571 CT HRT W/O DYE W/CA TEST: CPT

## 2025-04-01 ENCOUNTER — TELEPHONE (OUTPATIENT)
Dept: CARDIOLOGY | Facility: CLINIC | Age: 75
End: 2025-04-01
Payer: MEDICARE

## 2025-04-01 DIAGNOSIS — I25.10 CORONARY ARTERY CALCIFICATION: Primary | ICD-10-CM

## 2025-04-01 DIAGNOSIS — E78.5 DYSLIPIDEMIA: ICD-10-CM

## 2025-04-01 DIAGNOSIS — I10 ESSENTIAL HYPERTENSION: ICD-10-CM

## 2025-04-01 NOTE — TELEPHONE ENCOUNTER
Called and spoke with patient - advised per Dr. Dias she was noted to have calcification in the past on her mammogram as well so he would like her to come in and have cardiac testing.   I advised the orders were placed and someone from diagnostics would reach out to schedule.   Patient understood

## 2025-04-01 NOTE — TELEPHONE ENCOUNTER
Patient was noted to have coronary calcification on mammogram.  This was discussed in the past.  Follow-up in the office with stress Myoview and echocardiogram.  Request was placed in the chart.

## 2025-04-01 NOTE — TELEPHONE ENCOUNTER
Caller: Arpit Henry    Relationship: Self    Best call back number: [phone]      PATIENT IS REQUESTING THAT YOU REVIEW RESULTS FROM CT CALCIUM SCORE TESTING DONE 3.31.25

## 2025-04-21 ENCOUNTER — TELEPHONE (OUTPATIENT)
Dept: CARDIOLOGY | Facility: CLINIC | Age: 75
End: 2025-04-21
Payer: MEDICARE

## 2025-04-21 NOTE — TELEPHONE ENCOUNTER
[facility] CALLED IN REGARDS TO PATIENTS UPCOMING TESTING.  SHE IS OUT OF NETWORK WITH [facility] AND WILL HAVE TO BE SEEN SOMEWHERE ELSE FOR THOSE TEST.

## 2025-05-20 ENCOUNTER — OFFICE VISIT (OUTPATIENT)
Dept: ENDOCRINOLOGY | Facility: CLINIC | Age: 75
End: 2025-05-20
Payer: MEDICARE

## 2025-05-20 VITALS
OXYGEN SATURATION: 96 % | BODY MASS INDEX: 28.76 KG/M2 | HEIGHT: 58 IN | HEART RATE: 88 BPM | SYSTOLIC BLOOD PRESSURE: 125 MMHG | WEIGHT: 137 LBS | DIASTOLIC BLOOD PRESSURE: 80 MMHG

## 2025-05-20 DIAGNOSIS — E11.65 TYPE 2 DIABETES MELLITUS WITH HYPERGLYCEMIA, WITH LONG-TERM CURRENT USE OF INSULIN: ICD-10-CM

## 2025-05-20 DIAGNOSIS — E55.9 VITAMIN D DEFICIENCY: ICD-10-CM

## 2025-05-20 DIAGNOSIS — E21.0 PRIMARY HYPERPARATHYROIDISM: Primary | ICD-10-CM

## 2025-05-20 DIAGNOSIS — Z79.4 TYPE 2 DIABETES MELLITUS WITH HYPERGLYCEMIA, WITH LONG-TERM CURRENT USE OF INSULIN: ICD-10-CM

## 2025-05-20 DIAGNOSIS — E04.1 THYROID NODULE: ICD-10-CM

## 2025-05-20 DIAGNOSIS — N18.31 STAGE 3A CHRONIC KIDNEY DISEASE: ICD-10-CM

## 2025-05-20 DIAGNOSIS — E83.52 HYPERCALCEMIA: ICD-10-CM

## 2025-05-20 RX ORDER — KETOROLAC TROMETHAMINE 30 MG/ML
1 INJECTION, SOLUTION INTRAMUSCULAR; INTRAVENOUS
Qty: 1 EACH | Refills: 0 | Status: SHIPPED | OUTPATIENT
Start: 2025-05-20 | End: 2025-05-22 | Stop reason: SDUPTHER

## 2025-05-20 RX ORDER — OMEPRAZOLE 20 MG/1
20 CAPSULE, DELAYED RELEASE ORAL DAILY
COMMUNITY
Start: 2025-03-05

## 2025-05-20 RX ORDER — GLIPIZIDE 5 MG/1
10 TABLET ORAL
Start: 2025-05-20

## 2025-05-20 RX ORDER — HYDROCHLOROTHIAZIDE 12.5 MG/1
CAPSULE ORAL
Qty: 6 EACH | Refills: 3 | Status: SHIPPED | OUTPATIENT
Start: 2025-05-20 | End: 2025-05-22 | Stop reason: SDUPTHER

## 2025-05-20 RX ORDER — GINSENG 100 MG
CAPSULE ORAL
COMMUNITY

## 2025-05-20 RX ORDER — DAPAGLIFLOZIN 10 MG/1
10 TABLET, FILM COATED ORAL DAILY
Start: 2025-05-20

## 2025-05-20 RX ORDER — PEN NEEDLE, DIABETIC, SAFETY 30 GX3/16"
1 NEEDLE, DISPOSABLE MISCELLANEOUS
Qty: 100 EACH | Refills: 3 | Status: SHIPPED | OUTPATIENT
Start: 2025-05-20 | End: 2025-05-23

## 2025-05-20 RX ORDER — LANOLIN ALCOHOL/MO/W.PET/CERES
1000 CREAM (GRAM) TOPICAL DAILY
COMMUNITY

## 2025-05-20 NOTE — PATIENT INSTRUCTIONS
Please,    Hyperparathyroidism/hypercalcemia  - Maintain good hydration around 6 to 8 glasses of water every day.  - Maintain dietary calcium intake to daily sources.  - Maintain vitamin D 1000 units every day with meal.    Type 2 diabetes melitis  - Continue glipizide 10 mg twice a day  - Continue Farxiga 10 mg once a day  - Continue Trulicity 3 mg once a week  - Start taking insulin Lantus 10 units once a day    Please start checking blood sugar once a day in the morning and maintain logs.  I have also ordered continuous glucose monitoring system called freestyle evelia for you, if covered by the insurance you can start checking blood sugar with the freestyle evelia and can use fingersticks as needed.    Since you will start taking insulin therapy there is always going to be a risk for hypoglycemia/low blood sugar, please carry some source of sugar with you.    Thyroid nodules:  - Please plan for repeat ultrasound thyroid before next visit    Plan for repeat blood work and clinical follow-up in 3 months time.    Repeat blood work and clinical follow-up in 6 months time.    Thank you for your visit today.    If you have any questions or concerns please feel free to reach out of the office.

## 2025-05-22 DIAGNOSIS — E11.65 TYPE 2 DIABETES MELLITUS WITH HYPERGLYCEMIA, WITH LONG-TERM CURRENT USE OF INSULIN: ICD-10-CM

## 2025-05-22 DIAGNOSIS — Z79.4 TYPE 2 DIABETES MELLITUS WITH HYPERGLYCEMIA, WITH LONG-TERM CURRENT USE OF INSULIN: ICD-10-CM

## 2025-05-22 RX ORDER — HYDROCHLOROTHIAZIDE 12.5 MG/1
CAPSULE ORAL
Qty: 6 EACH | Refills: 3 | Status: SHIPPED | OUTPATIENT
Start: 2025-05-22

## 2025-05-22 RX ORDER — KETOROLAC TROMETHAMINE 30 MG/ML
1 INJECTION, SOLUTION INTRAMUSCULAR; INTRAVENOUS
Qty: 1 EACH | Refills: 0 | Status: SHIPPED | OUTPATIENT
Start: 2025-05-22

## 2025-05-23 RX ORDER — FLURBIPROFEN SODIUM 0.3 MG/ML
SOLUTION/ DROPS OPHTHALMIC
Qty: 100 EACH | Refills: 6 | Status: SHIPPED | OUTPATIENT
Start: 2025-05-23

## 2025-05-27 RX ORDER — PEN NEEDLE, DIABETIC 30 GX3/16"
1 NEEDLE, DISPOSABLE MISCELLANEOUS 4 TIMES DAILY
Qty: 150 EACH | Refills: 3 | Status: SHIPPED | OUTPATIENT
Start: 2025-05-27

## 2025-08-13 ENCOUNTER — HOSPITAL ENCOUNTER (OUTPATIENT)
Dept: ULTRASOUND IMAGING | Facility: HOSPITAL | Age: 75
Discharge: HOME | End: 2025-08-13
Admitting: INTERNAL MEDICINE
Payer: MEDICARE

## 2025-08-13 DIAGNOSIS — E04.1 THYROID NODULE: ICD-10-CM

## 2025-08-13 PROCEDURE — 76536 US EXAM OF HEAD AND NECK: CPT

## 2025-08-14 LAB — HBA1C MFR BLD: 8.2 %

## 2025-08-20 ENCOUNTER — OFFICE VISIT (OUTPATIENT)
Dept: ENDOCRINOLOGY | Facility: CLINIC | Age: 75
End: 2025-08-20
Payer: MEDICARE

## 2025-08-20 ENCOUNTER — TELEPHONE (OUTPATIENT)
Dept: ENDOCRINOLOGY | Facility: CLINIC | Age: 75
End: 2025-08-20

## 2025-08-20 VITALS
WEIGHT: 135 LBS | SYSTOLIC BLOOD PRESSURE: 142 MMHG | BODY MASS INDEX: 28.34 KG/M2 | OXYGEN SATURATION: 97 % | HEART RATE: 70 BPM | DIASTOLIC BLOOD PRESSURE: 84 MMHG | HEIGHT: 58 IN

## 2025-08-20 DIAGNOSIS — E21.0 PRIMARY HYPERPARATHYROIDISM: ICD-10-CM

## 2025-08-20 DIAGNOSIS — E83.52 HYPERCALCEMIA: ICD-10-CM

## 2025-08-20 DIAGNOSIS — N18.31 STAGE 3A CHRONIC KIDNEY DISEASE: ICD-10-CM

## 2025-08-20 DIAGNOSIS — Z79.4 TYPE 2 DIABETES MELLITUS WITH HYPERGLYCEMIA, WITH LONG-TERM CURRENT USE OF INSULIN: Primary | ICD-10-CM

## 2025-08-20 DIAGNOSIS — E55.9 VITAMIN D DEFICIENCY: ICD-10-CM

## 2025-08-20 DIAGNOSIS — E04.1 THYROID NODULE: ICD-10-CM

## 2025-08-20 DIAGNOSIS — E11.65 TYPE 2 DIABETES MELLITUS WITH HYPERGLYCEMIA, WITH LONG-TERM CURRENT USE OF INSULIN: Primary | ICD-10-CM

## 2025-08-20 RX ORDER — HYDROCHLOROTHIAZIDE 12.5 MG/1
CAPSULE ORAL
Qty: 6 EACH | Refills: 3 | Status: SHIPPED | OUTPATIENT
Start: 2025-08-20

## 2025-08-21 ENCOUNTER — TELEPHONE (OUTPATIENT)
Dept: ENDOCRINOLOGY | Facility: CLINIC | Age: 75
End: 2025-08-21
Payer: MEDICARE